# Patient Record
Sex: FEMALE | Race: WHITE | Employment: FULL TIME | URBAN - METROPOLITAN AREA
[De-identification: names, ages, dates, MRNs, and addresses within clinical notes are randomized per-mention and may not be internally consistent; named-entity substitution may affect disease eponyms.]

---

## 2021-04-20 ENCOUNTER — HOSPITAL ENCOUNTER (OUTPATIENT)
Dept: PHYSICAL THERAPY | Age: 66
Discharge: HOME | End: 2021-04-20
Payer: COMMERCIAL

## 2021-04-20 PROCEDURE — 97161 PT EVAL LOW COMPLEX 20 MIN: CPT

## 2021-04-20 PROCEDURE — 97110 THERAPEUTIC EXERCISES: CPT

## 2021-04-20 NOTE — PROGRESS NOTES
[facility] [facility] PHYSICAL THERAPY AT [place]  [address] [facility], Katty Gregorio [place], [address] - Phone: [phone]  Fax: [phone] / [address] [facility]  Patient Name: Abimbola Baumann : 1955   Treatment   Diagnosis: Low back pain Medical   Diagnosis: Low back pain [M54.5]   Onset Date: 2020     Referral Source: Abundio Lim MD Start of Care [facility]): 2021   Prior Hospitalization: See medical history Provider #: [id number]   Prior Level of Function: Pt was pain free with ADLs   Comorbidities: Pt reports high blood pressure   Medications: Verified on Patient Summary List   The Plan of Care and following information is based on the information from the initial evaluation.   ==================================================================================  Assessment / key information:  Pt is a 64 yo female that presents to PT with chief complaint of low back pain and L leg pain that began about 6-7 months ago with no known mechanism of injury. He/she presents with pain ranging from 2-10/10, located in the L side of the back down the left leg into the foot. Pain is made worse with sitting, better with staning. Pt has radicular sxs in the LLE, described as dull ache and pain. L/S AROM: flexion 75%, extension 25% LSB 50%, RSB 50%. Palpation reveals TTP to L piriformis and QL. MMT LEs: hip flexion 3+/5 with increased back pain, abduction 4/5, adduction 4/5, knee flexion 3+/5 with increased back pain, knee extension 4/5, ankle DF 3+/5. Single leg heel raise R: >15reps, L: 8reps. Repeated movement testing reveals abolished leg pain with L/S extension, sxs consistent with possible disc involvement (+) Special tests include: Passive SLR, Slump. Pt will benefit from PT interventions to address the aforementioned deficits and allow pt to return to PLOF.    Eval Complexity: History LOW Complexity : Zero comorbidities / personal factors that will impact the outcome / POC;  Examination  LOW Complexity : 1-2 Standardized tests and measures addressing body structure, function, activity limitation and / or participation in recreation ; Presentation LOW Complexity : Stable, uncomplicated ;  Decision Making MEDIUM Complexity : FOTO score of 26-74; Overall Complexity LOW   ==================================================================================  Problem List: pain affecting function, decrease ROM, decrease strength, decrease ADL/ functional abilitiies, decrease activity tolerance and decrease flexibility/ joint mobility   Treatment Plan may include any combination of the following: Therapeutic exercise, Therapeutic activities, Neuromuscular re-education, Physical agent/modality, Manual therapy, Patient education, Self Care training, Functional mobility training, Home safety training and Stair training  Patient / Family readiness to learn indicated by: asking questions, trying to perform skills and interest  Persons(s) to be included in education: patient (P)  Barriers to Learning/Limitations: no  Measures taken:    Patient Goal (s): \"relief\"   Patient self reported health status: good  Rehabilitation Potential: good   Short Term Goals: To be accomplished in  3  weeks:  1. Pt will be independent and compliant with HEP to decrease pain, increase ROM and return pt to PLOF. 2. Pt will demonstrate a GROC score of >/= +2 to show overall improvement in function     Long Term Goals: To be accomplished in  6  weeks:  1. Increase score on FOTO to > or = 62 to demo an increase in functional activity tolerance. 2. Pt will note < or = 2/10 pain with all mobility to improve comfort with ADLs.    3. Pt will demonstrate a GROC score of >/= +5 to show overall improvement in function  Frequency / Duration:   Patient to be seen  2  times per week for 4-6  weeks:  Patient / Caregiver education and instruction: self care, activity modification and exercises    Therapist Signature: Rosario Cole PT Date: 6/12/2789   Certification Period: - Time: 12:31 PM   ===========================================================================================  I certify that the above Physical Therapy Services are being furnished while the patient is under my care. I agree with the treatment plan and certify that this therapy is necessary. Physician Signature:        Date:       Time:        Kaden Patton MD    Please sign and return to [facility] or you may fax the signed copy to [phone]. Thank you.

## 2021-04-20 NOTE — PROGRESS NOTES
PHYSICAL THERAPY - DAILY TREATMENT NOTE    Patient Name: Sharifa Wall        Date: 2021  : 1955    Patient  Verified: YES  Visit #:     Insurance: Payor: Juwan He / Plan: [employer] HMO/CHOICE PLUS/POS / Product Type: HMO /      In time: 10:45 Out time: 11:40   Total Treatment Time: 55     Medicare Time Tracking (below)   Total Timed Codes (min):  - 1:1 Treatment Time:  -     TREATMENT AREA/ DIAGNOSIS = Low back pain [M54.5]    SUBJECTIVE  Pain Level (on 0 to 10 scale):  2  / 10   Medication Changes/New allergies or changes in medical history, any new surgeries or procedures?     NO    If yes, update Summary List   Subjective Functional Status/Changes:  []  No changes reported     See Eval      OBJECTIVE  Modalities Rationale:     decrease inflammation and decrease pain to improve patient's ability to perform ADLs without pain     min [] Estim, type/location:                                      []  att     []  unatt     []  w/US     []  w/ice    []  w/heat    min []  Mechanical Traction: type/lbs                   []  pro   []  sup   []  int   []  cont    []  before manual    []  after manual    min []  Ultrasound, settings/location:      min []  Iontophoresis w/ dexamethasone, location:                                               []  take home patch       []  in clinic   10 min [x]  Ice     []  Heat    location/position: L/S    min []  Vasopneumatic Device, press/temp:     min []  Other:    [] Skin assessment post-treatment (if applicable):    []  intact    []  redness- no adverse reaction     []redness  adverse reaction:        10 min Therapeutic Exercise:  [x]  See flow sheet   Rationale:      increase ROM and increase strength to improve the patients ability to perform ADLs without pain       Billed With/As:   [x] TE   [] TA   [] Neuro   [] Self Care Patient Education: [x] Review HEP    [] Progressed/Changed HEP based on:   [] positioning   [] body mechanics   [] transfers   [] heat/ice application    [] other:        Other Objective/Functional Measures:    See Eval   Post Treatment Pain Level (on 0 to 10) scale:   0  / 10     ASSESSMENT  Assessment/Changes in Function:     See Eval     []  See Progress Note/Recertification   Patient will continue to benefit from skilled PT services to modify and progress therapeutic interventions, address functional mobility deficits, address ROM deficits, address strength deficits, analyze and address soft tissue restrictions and analyze and cue movement patterns to attain remaining goals.    Progress toward goals / Updated goals:    See Eval     PLAN  [x]  Upgrade activities as tolerated YES Continue plan of care   []  Discharge due to :    []  Other:      Therapist: Daryle Peach ,DPT     Date: 4/20/2021 Time: 4:30 PM        Future Appointments   Date Time Provider Wally Wills   4/22/2021 11:00 AM Jalyn Quan, PT [facility]   4/27/2021 10:00 AM Seena Mountain, PTA [facility]   4/30/2021  9:45 AM Seena Mountain, PTA [facility]   5/4/2021  9:15 AM Marycruz Chang [facility]   5/6/2021  9:15 AM Seena Mountain, PTA [facility]

## 2021-04-22 ENCOUNTER — HOSPITAL ENCOUNTER (OUTPATIENT)
Dept: PHYSICAL THERAPY | Age: 66
Discharge: HOME | End: 2021-04-22
Payer: COMMERCIAL

## 2021-04-22 PROCEDURE — 97110 THERAPEUTIC EXERCISES: CPT

## 2021-04-22 NOTE — PROGRESS NOTES
PHYSICAL THERAPY - DAILY TREATMENT NOTE     Patient Name: Drew Holbrook        Date: 2021  : 1955   YES Patient  Verified  Visit #:     Insurance: Payor: [employer] / Plan: [email or web address] HMO/CHOICE PLUS/POS / Product Type: HMO /      In time: 1100 Out time: 8565   Total Treatment Time: 47     Medicare/[facility] Time Tracking (below)   Total Timed Codes (min):   1:1 Treatment Time:       TREATMENT AREA =  Low back pain [M54.5]    SUBJECTIVE    Pain Level (on 0 to 10 scale):  1  / 10   Medication Changes/New allergies or changes in medical history, any new surgeries or procedures? NO    If yes, update Summary List   Subjective Functional Status/Changes:  []  No changes reported       Functional improvements: \"I've been feeling a little better.   Still have some leg pain but more central LBP\"  Functional impairments: Increased pain and decreased core strength affecting ADL's         OBJECTIVE  Modalities Rationale:     decrease inflammation and decrease pain to improve patient's ability to perform ADL's w/o pain      min [] Estim, type/location:                                      []  att     []  unatt     []  w/US     []  w/ice    []  w/heat    min []  Mechanical Traction: type/lbs                   []  pro   []  sup   []  int   []  cont    []  before manual    []  after manual    min []  Ultrasound, settings/location:      min []  Iontophoresis w/ dexamethasone, location:                                               []  take home patch       []  in clinic   10 min [x]  Ice     []  Heat    location/position: Supine to L/S    min []  Vasopneumatic Device, press/temp:     min []  Other:    [x] Skin assessment post-treatment (if applicable):    [x]  intact    [x]  redness- no adverse reaction     []redness  adverse reaction:      37 min Therapeutic Exercise:  [x]  See flow sheet   Rationale:      increase ROM and increase strength to improve the patients ability to perform ADL's     Billed With/As:   [x] TE   [] TA   [] Neuro   [] Self Care Patient Education: [x] Review HEP    [x] Progressed/Changed HEP based on:   [x] positioning   [x] body mechanics   [] transfers   [] heat/ice application    [] other:        Other Objective/Functional Measures:    Initiated therex per flow sheet  Added TA brace and deadbug to HEP  Discussed body mechanics and ergonomics to reduce radicular symptoms at work   Post Treatment Pain Level (on 0 to 10) scale:   0  / 10     ASSESSMENT    Assessment/Changes in Function:     Tolerated introduction to treatment program well without increased symptoms. Patient able to abolish radicular symptoms with REIL and no return throughout treatment session. Definite core fatigue noted with new therex but demonstrated well with verbal cues for technique. []  See Progress Note/Recertification   Patient will continue to benefit from skilled PT services to modify and progress therapeutic interventions, address functional mobility deficits, address ROM deficits, address strength deficits, analyze and address soft tissue restrictions, analyze and cue movement patterns, analyze and modify body mechanics/ergonomics and assess and modify postural abnormalities to attain remaining goals.       Progress toward goals / Updated goals:    Progressing towards newly established goals     PLAN    [x]  Upgrade activities as tolerated YES Continue plan of care   []  Discharge due to :    []  Other:      Therapist: Ike Persaud PT    Date: 4/22/2021 Time: 8:36 AM     Future Appointments   Date Time Provider Wally Wills   4/22/2021 11:00 AM Dena Aase, PT [facility]   4/27/2021 10:00 AM Giorgi Goodman PTA [facility]   4/30/2021  9:45 AM Giorgibeckie Goodman PTA [facility]   5/4/2021  9:15 AM Chio Baig [facility]   5/6/2021  9:15 AM [facility] PTA [facility]

## 2021-04-27 ENCOUNTER — HOSPITAL ENCOUNTER (OUTPATIENT)
Dept: PHYSICAL THERAPY | Age: 66
Discharge: HOME | End: 2021-04-27
Payer: COMMERCIAL

## 2021-04-27 PROCEDURE — 97110 THERAPEUTIC EXERCISES: CPT

## 2021-04-27 NOTE — PROGRESS NOTES
PHYSICAL THERAPY - DAILY TREATMENT NOTE     Patient Name: Amor Contreras        Date: 2021  : 1955   YES Patient  Verified  Visit #:   3   of   12  Insurance: Payor: Brittany Newman / Plan: [employer] HMO/CHOICE PLUS/POS / Product Type: HMO /      In time: 10:08 Out time: 10:43   Total Treatment Time: 35     Medicare/[facility] Time Tracking (below)   Total Timed Codes (min):  - 1:1 Treatment Time:  -     TREATMENT AREA =  Low back pain [M54.5]    SUBJECTIVE    Pain Level (on 0 to 10 scale):  2  / 10   Medication Changes/New allergies or changes in medical history, any new surgeries or procedures?     NO    If yes, update Summary List   Subjective Functional Status/Changes:  []  No changes reported       Functional impairments: radicular pain from left hip to foot-intermittent, pt reporting increased left wrist pain after press ups-held for HEP         OBJECTIVE  Modalities Rationale:     decrease inflammation and decrease pain to improve patient's ability to perform ADL's w/o pain      min [] Estim, type/location:                                      []  att     []  unatt     []  w/US     []  w/ice    []  w/heat    min []  Mechanical Traction: type/lbs                   []  pro   []  sup   []  int   []  cont    []  before manual    []  after manual    min []  Ultrasound, settings/location:      min []  Iontophoresis w/ dexamethasone, location:                                               []  take home patch       []  in clinic   10 min [x]  Ice     []  Heat    location/position: Supine to L/S    min []  Vasopneumatic Device, press/temp:     min []  Other:    [x] Skin assessment post-treatment (if applicable):    [x]  intact    [x]  redness- no adverse reaction     []redness  adverse reaction:      25 min Therapeutic Exercise:  [x]  See flow sheet   Rationale:      increase ROM and increase strength to improve the patients ability to perform ADL's     Billed With/As:   [x] TE   [] TA   [] Neuro   [] Self Care Patient Education: [x] Review HEP  , use of towel roll for neutral spine, golfers bend, car ingress-egress  [x] Progressed/Changed HEP based on:   [x] positioning   [x] body mechanics   [] transfers   [] heat/ice application    [] other:        Other Objective/Functional Measures:  Trunk AROM: FF, bilateral SB ~ 90%-pain with LSB  Add RSG against wall  Pt education in positioning, use of HEP for centralization of sx     Post Treatment Pain Level (on 0 to 10) scale:  2  / 10     ASSESSMENT    Assessment/Changes in Function:     R SG against wall -centralized pain to LB  Standing trunk ext-inc L LE radicular sx  Hold press ups secondary to L wrist pain   []  See Progress Note/Recertification   Patient will continue to benefit from skilled PT services to modify and progress therapeutic interventions, address functional mobility deficits, address ROM deficits, address strength deficits, analyze and address soft tissue restrictions, analyze and cue movement patterns, analyze and modify body mechanics/ergonomics and assess and modify postural abnormalities to attain remaining goals.       Progress toward goals / Updated goals:    STG for HEP currently in progress     PLAN    [x]  Upgrade activities as tolerated YES Continue plan of care   []  Discharge due to :    []  Other:      Therapist: Josy Rayo PTA    Date: 4/27/2021 Time: 10:43  AM     Future Appointments   Date Time Provider Wally Wills   4/30/2021  9:45 AM Christiana Way PTA [facility]   5/4/2021  9:15 AM Joshua Wong [facility]   5/6/2021  9:15 AM Christiana Way PTA [facility]

## 2021-04-30 ENCOUNTER — HOSPITAL ENCOUNTER (OUTPATIENT)
Dept: PHYSICAL THERAPY | Age: 66
Discharge: HOME | End: 2021-04-30
Payer: COMMERCIAL

## 2021-04-30 PROCEDURE — 97110 THERAPEUTIC EXERCISES: CPT

## 2021-04-30 PROCEDURE — 97140 MANUAL THERAPY 1/> REGIONS: CPT

## 2021-04-30 NOTE — PROGRESS NOTES
PHYSICAL THERAPY - DAILY TREATMENT NOTE     Patient Name: Ro Saravia        Date: 2021  : 1955   YES Patient  Verified  Visit #:     Insurance: Payor: Severo Erica / Plan: [employer] HMO/CHOICE PLUS/POS / Product Type: HMO /      In time: 9:53 Out time: 4195   Total Treatment Time: 42     Medicare/[facility] Time Tracking (below)   Total Timed Codes (min):  - 1:1 Treatment Time:  -     TREATMENT AREA =  Low back pain [M54.5]    SUBJECTIVE    Pain Level (on 0 to 10 scale):  1 / 10-low back   Medication Changes/New allergies or changes in medical history, any new surgeries or procedures? NO    If yes, update Summary List   Subjective Functional Status/Changes:  []  No changes reported       Functional impairments:pain comes and goes   Mornings are the worse, eased with stretching and exercise.   Radicular pain intermittent throughout the day         OBJECTIVE  Modalities Rationale:     decrease inflammation and decrease pain to improve patient's ability to perform ADL's w/o pain      min [] Estim, type/location:                                      []  att     []  unatt     []  w/US     []  w/ice    []  w/heat    min []  Mechanical Traction: type/lbs                   []  pro   []  sup   []  int   []  cont    []  before manual    []  after manual    min []  Ultrasound, settings/location:      min []  Iontophoresis w/ dexamethasone, location:                                               []  take home patch       []  in clinic   10 min [x]  Ice     []  Heat    location/position: Supine to L/S    min []  Vasopneumatic Device, press/temp:     min []  Other:    [x] Skin assessment post-treatment (if applicable):    [x]  intact    [x]  redness- no adverse reaction     []redness  adverse reaction:      23 min Therapeutic Exercise:  [x]  See flow sheet   Rationale:      increase ROM and increase strength to improve the patients ability to perform ADL's   9 min Manual Therapy: Technique:      [x] S/DTM []IASTM []PROM [] Passive Stretching   []manual TPR    []Jt manipulation:Gr I [] II []  III [] IV[] V[]  Treatment Area:  Prone low back   Rationale:      decrease pain, increase ROM, increase tissue extensibility and decrease trigger points to improve patient's ability to improve tissue mobility in ADLs  The manual therapy interventions were performed at a separate and distinct time from the therapeutic activities interventions. Billed With/As:   [x] TE   [] TA   [] Neuro   [] Self Care Patient Education: [x] Review HEP  , use of towel roll for neutral spine, golfers bend, car ingress-egress  [x] Progressed/Changed HEP based on:   [x] positioning   [x] body mechanics   [] transfers   [] heat/ice application    [] other:        Other Objective/Functional Measures: Add SB 1    Post Treatment Pain Level (on 0 to 10) scale:    0/ 10     ASSESSMENT    Assessment/Changes in Function:   Good pain relief with prone positioning  Advanced gentle core stabilization without sx increase     []  See Progress Note/Recertification   Patient will continue to benefit from skilled PT services to modify and progress therapeutic interventions, address functional mobility deficits, address ROM deficits, address strength deficits, analyze and address soft tissue restrictions, analyze and cue movement patterns, analyze and modify body mechanics/ergonomics and assess and modify postural abnormalities to attain remaining goals. Progress toward goals / Updated goals:  1. Pt will be independent and compliant with HEP to decrease pain, increase ROM and return pt to PLOF. -partially met  2.  Pt will demonstrate a GROC score of >/= +2 to show overall improvement in function       PLAN    [x]  Upgrade activities as tolerated YES Continue plan of care   []  Discharge due to :    []  Other:      Therapist: Chna Grajeda PTA    Date: 4/30/2021 Time: 1035 AM     Future Appointments   Date Time Provider Department [place]   5/4/2021  9:15 AM Juanpablo Haynes [facility] [address]   5/6/2021  9:15 AM Talon Valencia PTA [facility] [address]

## 2021-05-04 ENCOUNTER — APPOINTMENT (OUTPATIENT)
Dept: PHYSICAL THERAPY | Age: 66
End: 2021-05-04

## 2021-05-25 ENCOUNTER — APPOINTMENT (OUTPATIENT)
Dept: PHYSICAL THERAPY | Age: 66
End: 2021-05-25

## 2021-05-27 ENCOUNTER — APPOINTMENT (OUTPATIENT)
Dept: PHYSICAL THERAPY | Age: 66
End: 2021-05-27

## 2021-07-14 NOTE — PROGRESS NOTES
[address] [facility] PHYSICAL THERAPY AT [address] [facility], [address] [place] [address], [address], [address] [place] [address]  Phone: [phone]  Fax: [phone] SUMMARY  Patient Name: Bishnu Fisher : 1955   Treatment/Medical Diagnosis: Low back pain [M54.5]   Referral Source: Tien Viramontes MD     Date of Initial Visit: 21 Attended Visits: 4 Missed Visits: 2     SUMMARY OF TREATMENT  Physical Therapy treatment consisted of Therapeutic exercise for lumbar ROM and strengthening, pt education in posture, body mechanics, activity modification for pain management, Manual Therapy, HEP, and moist heat/ice to allow patient to return to PLOF. CURRENT STATUS  Pt was unable to be formally reassessed secondary to not returning to PT after 21 visit. Goal/Measure of Progress Goal Met? 1. Pt will be independent and compliant with HEP to decrease pain, increase ROM and return pt to PLOF. Status at last Eval: dependent Current Status: Unable to be formally reassessed. no   2. Pt will demonstrate a GROC score of >/= +2 to show overall improvement in function   Status at last Eval: n/a Current Status: Unable to be formally reassessed no     RECOMMENDATIONS  Discontinue therapy due to lack of attendance or compliance. If you have any questions/comments please contact us directly at [phone]. Thank you for allowing us to assist in the care of your patient.     Therapist Signature: Sylavin Shabazz PTA Date: 21   Reporting Period:   n/a Time: 9:12 AM